# Patient Record
Sex: FEMALE | Race: WHITE | NOT HISPANIC OR LATINO
[De-identification: names, ages, dates, MRNs, and addresses within clinical notes are randomized per-mention and may not be internally consistent; named-entity substitution may affect disease eponyms.]

---

## 2018-07-11 ENCOUNTER — FORM ENCOUNTER (OUTPATIENT)
Age: 78
End: 2018-07-11

## 2018-07-11 ENCOUNTER — RX RENEWAL (OUTPATIENT)
Age: 78
End: 2018-07-11

## 2018-07-12 ENCOUNTER — APPOINTMENT (OUTPATIENT)
Dept: MRI IMAGING | Facility: HOSPITAL | Age: 78
End: 2018-07-12
Payer: MEDICARE

## 2018-07-12 ENCOUNTER — OUTPATIENT (OUTPATIENT)
Dept: OUTPATIENT SERVICES | Facility: HOSPITAL | Age: 78
LOS: 1 days | End: 2018-07-12
Payer: MEDICARE

## 2018-07-12 PROCEDURE — 70553 MRI BRAIN STEM W/O & W/DYE: CPT

## 2018-07-12 PROCEDURE — 70553 MRI BRAIN STEM W/O & W/DYE: CPT | Mod: 26

## 2018-07-12 PROCEDURE — A9585: CPT

## 2018-07-16 DIAGNOSIS — I10 ESSENTIAL (PRIMARY) HYPERTENSION: ICD-10-CM

## 2018-07-16 DIAGNOSIS — Z82.49 FAMILY HISTORY OF ISCHEMIC HEART DISEASE AND OTHER DISEASES OF THE CIRCULATORY SYSTEM: ICD-10-CM

## 2018-10-12 ENCOUNTER — APPOINTMENT (OUTPATIENT)
Dept: NEUROSURGERY | Facility: CLINIC | Age: 78
End: 2018-10-12
Payer: MEDICARE

## 2018-10-12 VITALS
WEIGHT: 160 LBS | HEART RATE: 63 BPM | DIASTOLIC BLOOD PRESSURE: 83 MMHG | TEMPERATURE: 97.8 F | OXYGEN SATURATION: 99 % | HEIGHT: 63 IN | RESPIRATION RATE: 18 BRPM | BODY MASS INDEX: 28.35 KG/M2 | SYSTOLIC BLOOD PRESSURE: 151 MMHG

## 2018-10-12 PROCEDURE — 99214 OFFICE O/P EST MOD 30 MIN: CPT

## 2021-03-11 ENCOUNTER — EMERGENCY (EMERGENCY)
Facility: HOSPITAL | Age: 81
LOS: 1 days | Discharge: HOME | End: 2021-03-11
Attending: EMERGENCY MEDICINE | Admitting: EMERGENCY MEDICINE
Payer: MEDICARE

## 2021-03-11 VITALS
TEMPERATURE: 98 F | DIASTOLIC BLOOD PRESSURE: 66 MMHG | RESPIRATION RATE: 18 BRPM | OXYGEN SATURATION: 98 % | HEIGHT: 63 IN | HEART RATE: 77 BPM | WEIGHT: 143.96 LBS | SYSTOLIC BLOOD PRESSURE: 168 MMHG

## 2021-03-11 VITALS
DIASTOLIC BLOOD PRESSURE: 67 MMHG | RESPIRATION RATE: 18 BRPM | HEART RATE: 71 BPM | SYSTOLIC BLOOD PRESSURE: 128 MMHG | OXYGEN SATURATION: 98 % | TEMPERATURE: 98 F

## 2021-03-11 DIAGNOSIS — Z90.49 ACQUIRED ABSENCE OF OTHER SPECIFIED PARTS OF DIGESTIVE TRACT: Chronic | ICD-10-CM

## 2021-03-11 DIAGNOSIS — Z20.822 CONTACT WITH AND (SUSPECTED) EXPOSURE TO COVID-19: ICD-10-CM

## 2021-03-11 DIAGNOSIS — K46.9 UNSPECIFIED ABDOMINAL HERNIA WITHOUT OBSTRUCTION OR GANGRENE: Chronic | ICD-10-CM

## 2021-03-11 DIAGNOSIS — E04.1 NONTOXIC SINGLE THYROID NODULE: ICD-10-CM

## 2021-03-11 LAB
ALBUMIN SERPL ELPH-MCNC: 4 G/DL — SIGNIFICANT CHANGE UP (ref 3.3–5)
ALP SERPL-CCNC: 79 U/L — SIGNIFICANT CHANGE UP (ref 40–120)
ALT FLD-CCNC: 25 U/L — SIGNIFICANT CHANGE UP (ref 10–45)
ANION GAP SERPL CALC-SCNC: 10 MMOL/L — SIGNIFICANT CHANGE UP (ref 5–17)
APTT BLD: 30.2 SEC — SIGNIFICANT CHANGE UP (ref 27.5–35.5)
AST SERPL-CCNC: 22 U/L — SIGNIFICANT CHANGE UP (ref 10–40)
BASOPHILS # BLD AUTO: 0.05 K/UL — SIGNIFICANT CHANGE UP (ref 0–0.2)
BASOPHILS NFR BLD AUTO: 0.5 % — SIGNIFICANT CHANGE UP (ref 0–2)
BILIRUB SERPL-MCNC: 0.8 MG/DL — SIGNIFICANT CHANGE UP (ref 0.2–1.2)
BLD GP AB SCN SERPL QL: NEGATIVE — SIGNIFICANT CHANGE UP
BUN SERPL-MCNC: 15 MG/DL — SIGNIFICANT CHANGE UP (ref 7–23)
CALCIUM SERPL-MCNC: 9.8 MG/DL — SIGNIFICANT CHANGE UP (ref 8.4–10.5)
CHLORIDE SERPL-SCNC: 96 MMOL/L — SIGNIFICANT CHANGE UP (ref 96–108)
CO2 SERPL-SCNC: 28 MMOL/L — SIGNIFICANT CHANGE UP (ref 22–31)
CREAT SERPL-MCNC: 0.75 MG/DL — SIGNIFICANT CHANGE UP (ref 0.5–1.3)
EOSINOPHIL # BLD AUTO: 0.1 K/UL — SIGNIFICANT CHANGE UP (ref 0–0.5)
EOSINOPHIL NFR BLD AUTO: 1.1 % — SIGNIFICANT CHANGE UP (ref 0–6)
GLUCOSE SERPL-MCNC: 97 MG/DL — SIGNIFICANT CHANGE UP (ref 70–99)
HCT VFR BLD CALC: 35.1 % — SIGNIFICANT CHANGE UP (ref 34.5–45)
HGB BLD-MCNC: 11.2 G/DL — LOW (ref 11.5–15.5)
IMM GRANULOCYTES NFR BLD AUTO: 0.4 % — SIGNIFICANT CHANGE UP (ref 0–1.5)
INR BLD: 1.03 — SIGNIFICANT CHANGE UP (ref 0.88–1.16)
LYMPHOCYTES # BLD AUTO: 3.54 K/UL — HIGH (ref 1–3.3)
LYMPHOCYTES # BLD AUTO: 37.2 % — SIGNIFICANT CHANGE UP (ref 13–44)
MCHC RBC-ENTMCNC: 27.1 PG — SIGNIFICANT CHANGE UP (ref 27–34)
MCHC RBC-ENTMCNC: 31.9 GM/DL — LOW (ref 32–36)
MCV RBC AUTO: 85 FL — SIGNIFICANT CHANGE UP (ref 80–100)
MONOCYTES # BLD AUTO: 0.67 K/UL — SIGNIFICANT CHANGE UP (ref 0–0.9)
MONOCYTES NFR BLD AUTO: 7 % — SIGNIFICANT CHANGE UP (ref 2–14)
NEUTROPHILS # BLD AUTO: 5.11 K/UL — SIGNIFICANT CHANGE UP (ref 1.8–7.4)
NEUTROPHILS NFR BLD AUTO: 53.8 % — SIGNIFICANT CHANGE UP (ref 43–77)
NRBC # BLD: 0 /100 WBCS — SIGNIFICANT CHANGE UP (ref 0–0)
PLATELET # BLD AUTO: 258 K/UL — SIGNIFICANT CHANGE UP (ref 150–400)
POTASSIUM SERPL-MCNC: 3.5 MMOL/L — SIGNIFICANT CHANGE UP (ref 3.5–5.3)
POTASSIUM SERPL-SCNC: 3.5 MMOL/L — SIGNIFICANT CHANGE UP (ref 3.5–5.3)
PROT SERPL-MCNC: 6.5 G/DL — SIGNIFICANT CHANGE UP (ref 6–8.3)
PROTHROM AB SERPL-ACNC: 12.3 SEC — SIGNIFICANT CHANGE UP (ref 10.6–13.6)
RBC # BLD: 4.13 M/UL — SIGNIFICANT CHANGE UP (ref 3.8–5.2)
RBC # FLD: 12 % — SIGNIFICANT CHANGE UP (ref 10.3–14.5)
RH IG SCN BLD-IMP: POSITIVE — SIGNIFICANT CHANGE UP
RH IG SCN BLD-IMP: POSITIVE — SIGNIFICANT CHANGE UP
SARS-COV-2 RNA SPEC QL NAA+PROBE: SIGNIFICANT CHANGE UP
SODIUM SERPL-SCNC: 134 MMOL/L — LOW (ref 135–145)
WBC # BLD: 9.51 K/UL — SIGNIFICANT CHANGE UP (ref 3.8–10.5)
WBC # FLD AUTO: 9.51 K/UL — SIGNIFICANT CHANGE UP (ref 3.8–10.5)

## 2021-03-11 PROCEDURE — 86850 RBC ANTIBODY SCREEN: CPT

## 2021-03-11 PROCEDURE — 85025 COMPLETE CBC W/AUTO DIFF WBC: CPT

## 2021-03-11 PROCEDURE — 85730 THROMBOPLASTIN TIME PARTIAL: CPT

## 2021-03-11 PROCEDURE — 86901 BLOOD TYPING SEROLOGIC RH(D): CPT

## 2021-03-11 PROCEDURE — 80053 COMPREHEN METABOLIC PANEL: CPT

## 2021-03-11 PROCEDURE — 36415 COLL VENOUS BLD VENIPUNCTURE: CPT

## 2021-03-11 PROCEDURE — 86900 BLOOD TYPING SEROLOGIC ABO: CPT

## 2021-03-11 PROCEDURE — U0005: CPT

## 2021-03-11 PROCEDURE — U0003: CPT

## 2021-03-11 PROCEDURE — 99284 EMERGENCY DEPT VISIT MOD MDM: CPT | Mod: CS

## 2021-03-11 PROCEDURE — 99284 EMERGENCY DEPT VISIT MOD MDM: CPT | Mod: 25

## 2021-03-11 PROCEDURE — 85610 PROTHROMBIN TIME: CPT

## 2021-03-11 PROCEDURE — 84443 ASSAY THYROID STIM HORMONE: CPT

## 2021-03-11 RX ORDER — LISINOPRIL 2.5 MG/1
1 TABLET ORAL
Qty: 0 | Refills: 0 | DISCHARGE

## 2021-03-11 RX ORDER — LEVOTHYROXINE SODIUM 125 MCG
1 TABLET ORAL
Qty: 0 | Refills: 0 | DISCHARGE

## 2021-03-11 RX ORDER — SIMVASTATIN 20 MG/1
1 TABLET, FILM COATED ORAL
Qty: 0 | Refills: 0 | DISCHARGE

## 2021-03-11 RX ORDER — HYDROCORTISONE 20 MG
1 TABLET ORAL
Qty: 0 | Refills: 0 | DISCHARGE

## 2021-03-11 NOTE — ED PROVIDER NOTE - CARE PROVIDER_API CALL
Chay Roy)  Otolaryngology  [address] [place] [address]  Phone: [phone]  Fax: [phone]  Follow Up Time:

## 2021-03-11 NOTE — ED ADULT TRIAGE NOTE - ARRIVAL FROM
Pt here for OB visit, +FM. No concerns at today's visit. Tdap given today. See MAR for administration details.  
Home

## 2021-03-11 NOTE — ED PROVIDER NOTE - ENMT, MLM
Airway patent, Nasal mucosa clear. Mouth with normal mucosa. Throat has no vesicles, no oropharyngeal exudates and uvula is midline. + enlarged thyroid, no stridor b/l

## 2021-03-11 NOTE — ED ADULT NURSE NOTE - PMH
HLD (hyperlipidemia)    HTN (hypertension)    Hypothyroid    Pituitary tumor  removed in 12/15/2008 by MD Ferguson

## 2021-03-11 NOTE — ED PROVIDER NOTE - NSFOLLOWUPINSTRUCTIONS_ED_ALL_ED_FT
Thyroid Nodules  WHAT YOU NEED TO KNOW:  Thyroid nodules are growths on your thyroid gland. Your thyroid makes hormones that help control your body temperature, heart rate, and growth. The hormones also control how fast your body uses food for energy. Some nodules are lumps of tissue, and others are filled with fluid.   DISCHARGE INSTRUCTIONS:  Medicines:   •Thyroid medicine is given to bring your thyroid hormone levels back to a normal range.   •Take your medicine as directed. Contact your healthcare provider if you think your medicine is not helping or if you have side effects. Tell him or her if you are allergic to any medicine. Keep a list of the medicines, vitamins, and herbs you take. Include the amounts, and when and why you take them. Bring the list or the pill bottles to follow-up visits. Carry your medicine list with you in case of an emergency.  Follow up with your healthcare provider as directed: You may need frequent blood tests to check your thyroid hormone levels. You may also need tests such as an ultrasound to check if any nodules are growing or have returned. Write down your questions so you remember to ask them during your visits.  Eat iodine-rich foods: Examples include fish, seaweed, dairy products, eggs, beans, and lean meat. Iodized salt also contains iodine. You may need to use iodized table salt when you cook and season your food. Iodine may be added to bread or to your drinking water. Ask for a list of foods that contain iodine, and ask how much iodine you need each day.  Contact your healthcare provider if:   •You have a new cough that does not improve.  •You begin choking or have new or increased trouble swallowing.   •Your voice becomes hoarse.  •You are losing weight without trying.  •You have questions or concerns about your condition or care.  Return to the emergency department if:   •You have sudden chest pain or trouble breathing.  •Your symptoms worsen, even after you take your medicines.

## 2021-03-11 NOTE — ED PROVIDER NOTE - CONSULTANT FREE TEXT FOR MDM DISCUSSED CASE WITH QUESTION
neurosurg since pt sent in by dr huffman neurosurg since pt sent in by dr huffman; I spoke to dr huffman directly and he's requesting that ent eval pt - he has spoken to dr collier about the pt

## 2021-03-11 NOTE — ED PROVIDER NOTE - ATTENDING CONTRIBUTION TO CARE
79 yo female sent by Dr Porras for eval of recently diagnosed thyroid nodule.  Pt has had recent extensive eval including MRI, us in [place] w plans for biopsy and poss thyroidectomy.  Pt wanted additional eval and recommendations due to feeling she had conflicting recommendations.  Pt w/o sig change in her thyroid related sx. Pt compliant w meds prescribed by her endo. Well appearing, nad, nc/at, lung cta, heart reg, abd soft, nt, ext no gross deformity, no gross neuro deficits   ENT consulted - recommend outpt fu.  Pt amenable and dc'd w plan to fu Dr Roy.

## 2021-03-11 NOTE — ED PROVIDER NOTE - CLINICAL SUMMARY MEDICAL DECISION MAKING FREE TEXT BOX
pt w/known thyroid nodule, has seen ent and endocrinologist in [place], is scheduled for thyroid bx in 4 d, c/o sob, weight loss and bp fluctuations - states that was told to come to ed by dr cisneros - I spoke to dr cisneros who is requesting ent eval and ok w/dc home. pt well appearing, non toxic, hemodynamically stable, labs wnl. ent consulted and eval'd pt pt w/known thyroid nodule, has seen ent and endocrinologist in [place], is scheduled for thyroid bx in 4 d, c/o sob, weight loss and bp fluctuations - states that was told to come to ed by dr cisneros - I spoke to dr cisneros who is requesting ent eval and ok w/dc home. pt well appearing, non toxic, hemodynamically stable, labs wnl. ent consulted and eval'd pt - plan is outpatient f/u. pt understands and agrees w/plan

## 2021-03-11 NOTE — CONSULT NOTE ADULT - SUBJECTIVE AND OBJECTIVE BOX
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 Pt states she was told to come to ED by Dr. To. Pt states over past few months, she has had 10 lb wt loss, intermittent elevated BP, and occasional HA; otherwise feels at baseline. Denies dyspnea, dysphagia, odynophagia, dysphonia. Had recent TFTs (T3: 9.2; TSH <0.005); her endocrinologist has not seen labs yet. Thyroid US showed 4cm L thryoid nodule per pt. Here for 2nd opinion regarding management.     PAST MEDICAL & SURGICAL HISTORY:  Pituitary tumor  removed in 12/15/2008 by MD Ferguson    Hypothyroid    HLD (hyperlipidemia)    HTN (hypertension)    Abdominal hernia  repair in 2015    History of appendectomy      Allergies    No Known Allergies    Intolerances      MEDICATIONS  (STANDING):    MEDICATIONS  (PRN):        Vital Signs Last 24 Hrs  T(C): 36.9 (11 Mar 2021 14:57), Max: 37 (11 Mar 2021 12:54)  T(F): 98.4 (11 Mar 2021 14:57), Max: 98.6 (11 Mar 2021 12:54)  HR: 71 (11 Mar 2021 14:57) (66 - 77)  BP: 128/67 (11 Mar 2021 14:57) (114/66 - 168/66)  BP(mean): --  RR: 18 (11 Mar 2021 14:57) (18 - 18)  SpO2: 98% (11 Mar 2021 14:57) (97% - 98%)                          11.2   9.51  )-----------( 258      ( 11 Mar 2021 12:55 )             35.1    03-11    134<L>  |  96  |  15  ----------------------------<  97  3.5   |  28  |  0.75    Ca    9.8      11 Mar 2021 12:55    TPro  6.5  /  Alb  4.0  /  TBili  0.8  /  DBili  x   /  AST  22  /  ALT  25  /  AlkPhos  79  03-11   PT/INR - ( 11 Mar 2021 12:55 )   PT: 12.3 sec;   INR: 1.03          PTT - ( 11 Mar 2021 12:55 )  PTT:30.2 sec    PHYSICAL EXAM:  Gen: NAD, Voice: strong  Face: no edema, erythema, or fluctuance. Parotid glands soft without mass  Eyes: no scleral injection, EOMI  Nose: Nares bilaterally patent, no discharge  Mouth: No Stridor / Drooling / Trismus.  Mucosa moist, tongue/uvula midline, oropharynx clear  Neck: Flat, supple, no lymphadenopathy, trachea midline, no thryomegaly  Lymphatic: No lymphadenopathy  Resp: breathing easily, no stridor      80F here with 4cm thyroid nodule. Currently undergoing workup in [place]. Here for 2nd opinion.  -Please have patient follow up with Dr. Roy for further outpatient evaluation  -Pt instructed to inform endocrinologist of recent TFTs to consider decreasing synthroid  -Pt plans to obtain [facility]-FNA of thyroid nodule in [place]  -[facility] with questions/cocnerns

## 2021-03-11 NOTE — ED ADULT NURSE NOTE - OBJECTIVE STATEMENT
Pt states about 3 weeks ago she started hearing her heartbeat on her left ear. This led to the discovery of a 4cm thyroid nodule 3 weeks ago, pending biopsy on Monday, referred to [facility] ED by MD huffman for shortness of breath, lack of appetite/weight loss (9 lbs in the last 4 weeks) excessive throat clearing, nausea. SOB is worst with exertion when walking short distance 200ft. Pt denies any chest pain, dizziness, vomiting or diarrhea. Pt was seen and discharged by another ED this week. Pt was diagnosed with UTI and was placed on nitrofurantoin. Hx of pituitary tumor removal

## 2021-03-11 NOTE — ED PROVIDER NOTE - PATIENT PORTAL LINK FT
You can access the FollowMyHealth Patient Portal offered by [facility] by registering at the following website: [email or web address]. By joining [facility]’s FollowMyHealth portal, you will also be able to view your health information using other applications (apps) compatible with our system.

## 2021-03-11 NOTE — ED ADULT NURSE NOTE - CHIEF COMPLAINT QUOTE
PT presents reporting she has recently discovered 4cm thyroid nodule, pending biopsy on monday, referred to [facility] ED by MD huffman for shortness of breath, lack of appetite/weight loss, excessive throat clearing. Pt was seen and discharged by another ED this week.

## 2021-03-11 NOTE — ED ADULT TRIAGE NOTE - CHIEF COMPLAINT QUOTE
PT presents reporting she has recently discovered 4cm thyroid nodule, pending biopsy on monday, referred to [facility] ED by MD huffman for shortness of breath, lack of appetite/weight loss, excessive throat clearing. Pt was seen and discharged by another ED this week.

## 2021-03-12 ENCOUNTER — TRANSCRIPTION ENCOUNTER (OUTPATIENT)
Age: 81
End: 2021-03-12

## 2021-03-15 ENCOUNTER — APPOINTMENT (OUTPATIENT)
Dept: OTOLARYNGOLOGY | Facility: CLINIC | Age: 81
End: 2021-03-15
Payer: MEDICARE

## 2021-03-15 VITALS
TEMPERATURE: 97.7 F | OXYGEN SATURATION: 98 % | SYSTOLIC BLOOD PRESSURE: 136 MMHG | WEIGHT: 144 LBS | DIASTOLIC BLOOD PRESSURE: 75 MMHG | HEART RATE: 61 BPM | BODY MASS INDEX: 25.52 KG/M2 | HEIGHT: 63 IN

## 2021-03-15 DIAGNOSIS — E78.00 PURE HYPERCHOLESTEROLEMIA, UNSPECIFIED: ICD-10-CM

## 2021-03-15 DIAGNOSIS — Z78.9 OTHER SPECIFIED HEALTH STATUS: ICD-10-CM

## 2021-03-15 PROCEDURE — 31575 DIAGNOSTIC LARYNGOSCOPY: CPT

## 2021-03-15 PROCEDURE — 99204 OFFICE O/P NEW MOD 45 MIN: CPT | Mod: 25

## 2021-03-15 RX ORDER — ASCORBIC ACID 500 MG
TABLET ORAL
Refills: 0 | Status: ACTIVE | COMMUNITY

## 2021-03-15 NOTE — PHYSICAL EXAM
[Normal] : no neck adenopathy [de-identified] : left neck 4cm thyroid nodule [Laryngoscopy Performed] : laryngoscopy was performed, see procedure section for findings

## 2021-03-15 NOTE — HISTORY OF PRESENT ILLNESS
[de-identified] : 79yo F, referred by Dr. To for a thyroid nodule evaluation. recently as part of tinnitus evaluation she was found to have a left thyroid 4cm nodule, with oppressed TSH and increased T4. she is under Syntyroid tx since 2008 after a pituitary surgery. She denies compressive symptoms of dysphagia/dyspnea/AW. \par she denies previous thyroid issues, denies familial hx of thyroid ca, or irradiation exposure. \par \par 2/26/21 Thyroid US \par LEFT lobe 6.2 x 2.8 x 1.6 cm with 4 x 3 x 2.7 cm hyperechoic MLP nodule.\par RIGHT lobe 3.9 x 1.5 x 1.2 cm with 8 mm, 6 mm, and 3 mm nodules.\par fna was never done\par 3/1 TSH <0.005 L, FT4 3.20 H, FT3 9.2 H, TPO Ab 62 H, Tg Ab <1\par On levothyroxine 75mcg by endo. due to ED visits x 2 for hyperthyroid symptoms, levothyroxine tx is on hold. \par

## 2021-03-15 NOTE — REASON FOR VISIT
[Initial Evaluation] : an initial evaluation for [Family Member] : family member [FreeTextEntry2] : thyroid nodule

## 2021-03-15 NOTE — PROCEDURE
[Image(s) Captured] : image(s) captured and filed [Unable to Cooperate with Mirror] : patient unable to cooperate with mirror [de-identified] : Flexible fiberoptic laryngoscope advanced orally, tonsillar pillars and tonsils/tonsil bed visualized, oropharyngeal and hypopharyngeal walls and BOT, epiglottis all visualized, no masses, no fungating/ulcerating lesions evident; larynx visualized, true vocal cords abduct and adduct and meet in the midline no leuko/erythroplakic lesions . No pooling of secretions.

## 2021-03-15 NOTE — ASSESSMENT
[FreeTextEntry1] : 81 yo F, with 4cm left thyroid nodule, and hyperthyroid tft's under levothyroxine. \par We thoroughly discussed the diagnosis with the patient and explained the required workup, f/u and treatment options.\par \par \par plan:\par - Ask [facility] which thyroid scan they recommend\par - Obtain [facility] scan\par - f/u with results\par - f/u endo, f/u pcp/cardio for blood pressure\par - RTC sooner should any worrisome symptoms develop.\par - Pt verbalized understanding of above.\par

## 2021-03-17 PROBLEM — E78.5 HYPERLIPIDEMIA, UNSPECIFIED: Chronic | Status: ACTIVE | Noted: 2021-03-11

## 2021-03-17 PROBLEM — D49.7 NEOPLASM OF UNSPECIFIED BEHAVIOR OF ENDOCRINE GLANDS AND OTHER PARTS OF NERVOUS SYSTEM: Chronic | Status: ACTIVE | Noted: 2021-03-11

## 2021-03-17 PROBLEM — E03.9 HYPOTHYROIDISM, UNSPECIFIED: Chronic | Status: ACTIVE | Noted: 2021-03-11

## 2021-03-17 PROBLEM — I10 ESSENTIAL (PRIMARY) HYPERTENSION: Chronic | Status: ACTIVE | Noted: 2021-03-11

## 2021-03-18 ENCOUNTER — OUTPATIENT (OUTPATIENT)
Dept: OUTPATIENT SERVICES | Facility: HOSPITAL | Age: 81
LOS: 1 days | End: 2021-03-18
Payer: MEDICARE

## 2021-03-18 DIAGNOSIS — K46.9 UNSPECIFIED ABDOMINAL HERNIA WITHOUT OBSTRUCTION OR GANGRENE: Chronic | ICD-10-CM

## 2021-03-18 DIAGNOSIS — Z90.49 ACQUIRED ABSENCE OF OTHER SPECIFIED PARTS OF DIGESTIVE TRACT: Chronic | ICD-10-CM

## 2021-03-19 ENCOUNTER — RESULT REVIEW (OUTPATIENT)
Age: 81
End: 2021-03-19

## 2021-03-19 PROCEDURE — 78014 THYROID IMAGING W/BLOOD FLOW: CPT | Mod: 26,ME

## 2021-03-19 PROCEDURE — A9516: CPT

## 2021-03-19 PROCEDURE — 78014 THYROID IMAGING W/BLOOD FLOW: CPT

## 2021-03-19 PROCEDURE — G1004: CPT

## 2021-04-02 ENCOUNTER — APPOINTMENT (OUTPATIENT)
Dept: ENDOCRINOLOGY | Facility: CLINIC | Age: 81
End: 2021-04-02
Payer: MEDICARE

## 2021-04-02 VITALS — BODY MASS INDEX: 24.45 KG/M2 | HEIGHT: 63 IN | WEIGHT: 138 LBS

## 2021-04-02 DIAGNOSIS — E04.2 NONTOXIC MULTINODULAR GOITER: ICD-10-CM

## 2021-04-02 DIAGNOSIS — E04.1 NONTOXIC SINGLE THYROID NODULE: ICD-10-CM

## 2021-04-02 PROCEDURE — 99204 OFFICE O/P NEW MOD 45 MIN: CPT | Mod: 95

## 2021-04-02 NOTE — ASSESSMENT
[FreeTextEntry1] : Pituitary adenoma history and on thyroid and cortisol replacements but she may not need these.\par She is off thyroxine for 2 weeks now.  Wait another two weeks and then go for TFTs (4 weeks off, so that thyroxine will be completely out of her system), and if normal, she can stay off thyroxine.  It can be tricky with pituitary disease, TSH may not be reliable.  Goal free T4 in the upper half of normal range.  But will also check FSH to check her pituitary function; FSH should be high since she’s menopausal.  If FSH is high, then her pit function is good.\par Reduce hydrocortisone to 10mg am, and 5pm  and then will check am cortisol prior to am dose.  if this is normal, will continue trying to taper down HC dose.  Symptoms of adrenal insufficiency reviewed, in case they develop.\par \par Multinodular thyroid.\par She likely will need FNA of the thyroid nodule since L side is relatively “cold” on scan  so the nodule is not autonomously functioning (not “hot” which are usually benign and don’t need FNA)\par Many of her symptoms are consistent with hyperthyroidism and should resolve now that she is off thyroxine.\par \par

## 2021-04-02 NOTE — HISTORY OF PRESENT ILLNESS
[Other Location: e.g. School (Enter Location, City,State)___] : at [unfilled], at the time of the visit. 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  Lost 17 lb since Feb and still losing.  Lost 2 lb this week.\par In January, was feeling pulsing in her ear, went to ENT and on imaging, was found to have 4cm thyroid nodules\par Went for thyroid scan and uptake, results reviewed:  normal uptake on R, decreased uptake on L side. 47.7% uptake. After stopping thyroxine one week prior\par After stopping thyroxine, first felt fatigued but now feels better, less VILLAGOMEZ and palpitations.\par Had some nausea before, but not now.\par BP typically 150s/70s and pulse 50-70s.  takes beta blocker when pulse over 60.\par Has to eat small portions or else throat feels funny.\par has never needed stress dose steroids, has been healthy since her pituitary surgery\par \par Meds:\par Hydrocortisone 10mg am and with dinner\par Levothyroxine 75mcg\par Lisinopril, HCTZ, \par Simvastatin 10mg, aspirin 81mg\par

## 2021-04-02 NOTE — REASON FOR VISIT
[Initial Evaluation] : an initial evaluation [Pituitary Evaluation/ Disorder] : pituitary evaluation/disorder [Thyroid nodule/ MNG] : thyroid nodule/ MNG [FreeTextEntry2] : Dr Roy

## 2021-04-02 NOTE — DATA REVIEWED
[FreeTextEntry1] : 3/11/21: TSH < 0.001, tot chol 143, HDL 50, LDL 74, trig 106\par 2/21: free T3 9.2 (2-4.4 normal), free T4 3.2, TSH < 0.005, TPO 62, Tg Ab < 1\par \par thyroid sono, 3/21:\par enlarged L lobe with dominant nodule 40 x 30 x 27mm, hyperechoic, tall > wide\par sub-cm nodules on the R (7mm, 3mm, 3mm)\par \par thyroid uptake/scan, 3/21: uptake 47.7%\par uniform uptake in R lobe with normal shape.  smaller L lobe with less activity c/w cold nodule in L lobe\par \par MRI, 2018, pituitary: \par expected post surgical change.  no masslike enhancing tissue in sella or suprasellar cistern.  thin rim on enhancing tissue lining sells, presumably residual pituitary gland.  stalk deviated to R.

## 2021-04-06 ENCOUNTER — TRANSCRIPTION ENCOUNTER (OUTPATIENT)
Age: 81
End: 2021-04-06

## 2021-04-07 ENCOUNTER — TRANSCRIPTION ENCOUNTER (OUTPATIENT)
Age: 81
End: 2021-04-07

## 2021-04-12 ENCOUNTER — TRANSCRIPTION ENCOUNTER (OUTPATIENT)
Age: 81
End: 2021-04-12

## 2021-04-19 ENCOUNTER — TRANSCRIPTION ENCOUNTER (OUTPATIENT)
Age: 81
End: 2021-04-19

## 2021-04-23 ENCOUNTER — TRANSCRIPTION ENCOUNTER (OUTPATIENT)
Age: 81
End: 2021-04-23

## 2021-04-30 ENCOUNTER — TRANSCRIPTION ENCOUNTER (OUTPATIENT)
Age: 81
End: 2021-04-30

## 2021-05-11 ENCOUNTER — TRANSCRIPTION ENCOUNTER (OUTPATIENT)
Age: 81
End: 2021-05-11

## 2021-05-12 ENCOUNTER — TRANSCRIPTION ENCOUNTER (OUTPATIENT)
Age: 81
End: 2021-05-12

## 2021-05-12 ENCOUNTER — APPOINTMENT (OUTPATIENT)
Dept: ULTRASOUND IMAGING | Facility: HOSPITAL | Age: 81
End: 2021-05-12
Payer: MEDICARE

## 2021-05-12 ENCOUNTER — OUTPATIENT (OUTPATIENT)
Dept: OUTPATIENT SERVICES | Facility: HOSPITAL | Age: 81
LOS: 1 days | End: 2021-05-12
Payer: MEDICARE

## 2021-05-12 ENCOUNTER — RESULT REVIEW (OUTPATIENT)
Age: 81
End: 2021-05-12

## 2021-05-12 DIAGNOSIS — K46.9 UNSPECIFIED ABDOMINAL HERNIA WITHOUT OBSTRUCTION OR GANGRENE: Chronic | ICD-10-CM

## 2021-05-12 DIAGNOSIS — Z90.49 ACQUIRED ABSENCE OF OTHER SPECIFIED PARTS OF DIGESTIVE TRACT: Chronic | ICD-10-CM

## 2021-05-12 PROCEDURE — 88305 TISSUE EXAM BY PATHOLOGIST: CPT | Mod: 26

## 2021-05-12 PROCEDURE — 88173 CYTOPATH EVAL FNA REPORT: CPT

## 2021-05-12 PROCEDURE — 88173 CYTOPATH EVAL FNA REPORT: CPT | Mod: 26

## 2021-05-12 PROCEDURE — 88305 TISSUE EXAM BY PATHOLOGIST: CPT

## 2021-05-12 PROCEDURE — 10005 FNA BX W/US GDN 1ST LES: CPT

## 2021-05-13 ENCOUNTER — TRANSCRIPTION ENCOUNTER (OUTPATIENT)
Age: 81
End: 2021-05-13

## 2021-05-14 ENCOUNTER — APPOINTMENT (OUTPATIENT)
Dept: ENDOCRINOLOGY | Facility: CLINIC | Age: 81
End: 2021-05-14
Payer: MEDICARE

## 2021-05-14 VITALS
HEIGHT: 63 IN | BODY MASS INDEX: 23.92 KG/M2 | DIASTOLIC BLOOD PRESSURE: 68 MMHG | WEIGHT: 135 LBS | HEART RATE: 75 BPM | SYSTOLIC BLOOD PRESSURE: 122 MMHG

## 2021-05-14 DIAGNOSIS — E05.00 THYROTOXICOSIS WITH DIFFUSE GOITER W/OUT THYROTOXIC CRISIS OR STORM: ICD-10-CM

## 2021-05-14 LAB — NON-GYNECOLOGICAL CYTOLOGY STUDY: SIGNIFICANT CHANGE UP

## 2021-05-14 PROCEDURE — 99214 OFFICE O/P EST MOD 30 MIN: CPT

## 2021-05-14 NOTE — ASSESSMENT
[FreeTextEntry1] : Hyperthyroid, probably Graves with cold nodule.\par If nodule pathology comes back suspicious or positive for malignancy, will refer for total thyroidectomy, which will remove nodule and solve hyperthyroidism.\par Start methimazole 10mg/day which she will continue if the nodule is benign (then will treat with medication for now) but she can still have surgery as a treatment for hyperthyroidism.\par She will need lifelong thyroid replacement if she undergoes thyroid surgery (but she was taking levothyroxine before, so she is used to taking it).\par can hold beta blocker and take only if heart rate is over 80\par [facility] form given to recheck TFTs in 6 weeks\par

## 2021-05-14 NOTE — HISTORY OF PRESENT ILLNESS
[FreeTextEntry1] : here with daughter\par Lost 5 lb in the past week.  \par no heat intolerance.  appetite is ok but she gets full quickly so is eating less\par VILLAGOMEZ, easily winded.  BMs 2-3x/day\par always coughing\par fully vaccinated for Covid\par no palpitations but takes beta blocker when heart rate is over 60.  \par had FNA of thyroid nodule two days ago\par sister has Hashimoto's\par moving to [facility] next week\par \par PMH: pit macroadenoma, surgery 2008\par thyroid nodule, hyperthyroid\par \par Meds:\par Hydrocortisone 10mg am and  5mg pm\par Levothyroxine 75mcg -- off\par Lisinopril, HCTZ, \par Simvastatin 10mg, aspirin 81mg\par

## 2021-05-14 NOTE — DATA REVIEWED
[FreeTextEntry1] : 5/21: TSH < 0.005, T4 10.3, free T4 2.24, T3 189\par 3/11/21: TSH < 0.001, tot chol 143, HDL 50, LDL 74, trig 106\par 2/21: free T3 9.2 (2-4.4 normal), free T4 3.2, TSH < 0.005, TPO 62, Tg Ab < 1\par \par thyroid sono, 3/21:\par enlarged L lobe with dominant nodule 40 x 30 x 27mm, hyperechoic, tall > wide\par sub-cm nodules on the R (7mm, 3mm, 3mm)\par \par thyroid uptake/scan, 3/21: uptake 47.7%\par uniform uptake in R lobe with normal shape.  smaller L lobe with less activity c/w cold nodule in L lobe\par \par MRI, 2018, pituitary: \par expected post surgical change.  no masslike enhancing tissue in sella or suprasellar cistern.  thin rim on enhancing tissue lining sells, presumably residual pituitary gland.  stalk deviated to R.

## 2021-05-18 ENCOUNTER — APPOINTMENT (OUTPATIENT)
Dept: ENDOCRINOLOGY | Facility: CLINIC | Age: 81
End: 2021-05-18

## 2021-05-27 ENCOUNTER — TRANSCRIPTION ENCOUNTER (OUTPATIENT)
Age: 81
End: 2021-05-27

## 2021-07-02 ENCOUNTER — NON-APPOINTMENT (OUTPATIENT)
Age: 81
End: 2021-07-02

## 2021-09-28 ENCOUNTER — TRANSCRIPTION ENCOUNTER (OUTPATIENT)
Age: 81
End: 2021-09-28

## 2021-09-29 ENCOUNTER — TRANSCRIPTION ENCOUNTER (OUTPATIENT)
Age: 81
End: 2021-09-29

## 2021-10-28 ENCOUNTER — TRANSCRIPTION ENCOUNTER (OUTPATIENT)
Age: 81
End: 2021-10-28

## 2021-11-05 ENCOUNTER — APPOINTMENT (OUTPATIENT)
Dept: ENDOCRINOLOGY | Facility: CLINIC | Age: 81
End: 2021-11-05
Payer: MEDICARE

## 2021-11-05 VITALS — WEIGHT: 145 LBS | BODY MASS INDEX: 25.69 KG/M2

## 2021-11-05 PROCEDURE — 99214 OFFICE O/P EST MOD 30 MIN: CPT | Mod: 95

## 2021-11-05 RX ORDER — METHIMAZOLE 5 MG/1
5 TABLET ORAL DAILY
Qty: 30 | Refills: 2 | Status: DISCONTINUED | COMMUNITY
Start: 2021-05-14 | End: 2021-11-05

## 2021-11-05 NOTE — ASSESSMENT
[FreeTextEntry1] : Graves with cold nodule\par FNA benign in May 2021.\par Now in hypothyroid range.  Discontinue methimazole and recheck TFTs in 6 weeks (will send [facility] form)\par reviewed symptoms of hypothyroidism, including bradycardia and cold intolerance (which she has), hair loss, fatigue, weight gain.   hypothyroidism can also contribute to high cholesterol and anemia.\par \par Pituitary adenoma.  Secondary adrenal insufficiency\par continue maintenance hydrocortisone dose.  Discussed the need for stress dose steroids when acutely ill (eg having fever, pneumonia) or if undergoing surgery.  Would need to increase hydrocortisone by 2-3x normal dose, and before surgery, would recommend iv hydrocortisone on the morning of surgery with rapid taper to usual dose if no complications.  \par RTO 6 months but will check labs in between.

## 2021-11-05 NOTE — HISTORY OF PRESENT ILLNESS
[Home] : at home, [unfilled] , at the time of the visit. [Medical Office: ([facility])___] : at the medical office located in  [Verbal consent obtained from patient] : the patient, [unfilled] [FreeTextEntry1] : Daughter also present on video\par Feeling cold all the time\par no constipation.   not more sleepy, still has trouble falling asleep at night but sometimes dozes off while reading during the day\par no dizziness or lightheadedness\par no hair loss\par weight is 145 lb\par heart rate now ranging 45-60\par has new PCP, was told cholesterol is high\par labs reviewed from 11/21:  low T3, T4\par she has follow up with Dr To at end of 2022.\par \par PMH: pit macroadenoma, surgery 2008\par thyroid nodule, hyperthyroid\par \par Meds:\par Hydrocortisone 10mg am and  5mg pm\par methimazole 5mg/day\par Levothyroxine 75mcg -- off\par Lisinopril, HCTZ, \par Simvastatin 10mg\par

## 2021-11-05 NOTE — DATA REVIEWED
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  smaller L lobe with less activity c/w cold nodule in L lobe\par \par MRI, 2018, pituitary: \par expected post surgical change.  no masslike enhancing tissue in sella or suprasellar cistern.  thin rim on enhancing tissue lining sells, presumably residual pituitary gland.  stalk deviated to R.

## 2021-12-13 ENCOUNTER — TRANSCRIPTION ENCOUNTER (OUTPATIENT)
Age: 81
End: 2021-12-13

## 2022-04-08 ENCOUNTER — TRANSCRIPTION ENCOUNTER (OUTPATIENT)
Age: 82
End: 2022-04-08

## 2022-04-08 RX ORDER — METHIMAZOLE 5 MG/1
5 TABLET ORAL DAILY
Qty: 90 | Refills: 0 | Status: ACTIVE | COMMUNITY
Start: 2022-04-08 | End: 1900-01-01

## 2022-04-11 ENCOUNTER — TRANSCRIPTION ENCOUNTER (OUTPATIENT)
Age: 82
End: 2022-04-11

## 2022-04-22 ENCOUNTER — TRANSCRIPTION ENCOUNTER (OUTPATIENT)
Age: 82
End: 2022-04-22

## 2022-05-10 ENCOUNTER — RX RENEWAL (OUTPATIENT)
Age: 82
End: 2022-05-10

## 2022-05-16 ENCOUNTER — TRANSCRIPTION ENCOUNTER (OUTPATIENT)
Age: 82
End: 2022-05-16

## 2023-03-15 ENCOUNTER — NON-APPOINTMENT (OUTPATIENT)
Age: 83
End: 2023-03-15

## 2023-03-16 ENCOUNTER — APPOINTMENT (OUTPATIENT)
Dept: NEUROSURGERY | Facility: CLINIC | Age: 83
End: 2023-03-16
Payer: MEDICARE

## 2023-03-16 PROCEDURE — 99442: CPT | Mod: 95

## 2023-03-16 NOTE — ASSESSMENT
[FreeTextEntry1] : The patient’s established problem of resected pituitary adenoma.    I had a long discussion with the patient regarding the role of serial imaging in the coming weeks.  Therapeutic and diagnostic tests include MRI pituitary with and without contrast. She will follow up with Dr. To after MRI is complete.. I have explained the alternatives, risks and benefits to the patient ands he understands and agrees to proceed. \par \par

## 2023-03-16 NOTE — REASON FOR VISIT
[Home] : at home, [unfilled] , at the time of the visit. [Medical Office: ([facility])___] : at the medical office located in  [Patient] : the patient [New Patient Visit] : a new patient visit [FreeTextEntry1] : 82  year old female s/p endoscopic resection of pituitary tumor on 12/15/2008 at [facility]\par Pathology: consistent with infarcted pituitary adenoma\par \par Also s/p removal of bone plate in back of the nose on 3/1/2010.\par \par She has been feeling well. She feels well. Denies headaches, nausea, vomiting, vision or gait changes, excessive thirst or urination, or excessive fatigue.\par \par She presents today to reestablish care after 5 years.\par \par She denies abnormal growth of hands/feet, breast leaking, abnormal growth of facial features, significant unexplained weight loss/gain, sudden or peripheral vision loss and abnormal hair growth/loss.\par

## 2023-04-13 ENCOUNTER — APPOINTMENT (OUTPATIENT)
Dept: NEUROSURGERY | Facility: HOSPITAL | Age: 83
End: 2023-04-13

## 2023-04-14 ENCOUNTER — OUTPATIENT (OUTPATIENT)
Dept: OUTPATIENT SERVICES | Facility: HOSPITAL | Age: 83
LOS: 1 days | End: 2023-04-14
Payer: MEDICARE

## 2023-04-14 ENCOUNTER — APPOINTMENT (OUTPATIENT)
Dept: MRI IMAGING | Facility: HOSPITAL | Age: 83
End: 2023-04-14

## 2023-04-14 DIAGNOSIS — K46.9 UNSPECIFIED ABDOMINAL HERNIA WITHOUT OBSTRUCTION OR GANGRENE: Chronic | ICD-10-CM

## 2023-04-14 DIAGNOSIS — Z90.49 ACQUIRED ABSENCE OF OTHER SPECIFIED PARTS OF DIGESTIVE TRACT: Chronic | ICD-10-CM

## 2023-04-14 PROCEDURE — 70552 MRI BRAIN STEM W/DYE: CPT

## 2023-04-14 PROCEDURE — A9585: CPT

## 2023-04-14 PROCEDURE — 70552 MRI BRAIN STEM W/DYE: CPT | Mod: 26,MH

## 2023-05-01 ENCOUNTER — APPOINTMENT (OUTPATIENT)
Dept: NEUROSURGERY | Facility: CLINIC | Age: 83
End: 2023-05-01
Payer: MEDICARE

## 2023-05-01 PROCEDURE — 99214 OFFICE O/P EST MOD 30 MIN: CPT | Mod: 95

## 2023-05-01 NOTE — ED ADULT NURSE NOTE - NSIMPLEMENTINTERV_GEN_ALL_ED
Please schedule next available for hospital f/u. Needs to be F2F.   Implemented All Universal Safety Interventions:  [place] to call system. Call bell, personal items and telephone within reach. Instruct patient to call for assistance. Room bathroom lighting operational. Non-slip footwear when patient is off stretcher. Physically safe environment: no spills, clutter or unnecessary equipment. Stretcher in lowest position, wheels locked, appropriate side rails in place.

## 2023-05-01 NOTE — REASON FOR VISIT
[New Patient Visit] : a new patient visit [Home] : at home, [unfilled] , at the time of the visit. [Medical Office: ([facility])___] : at the medical office located in  [FreeTextEntry1] : 83  year old female s/p endoscopic resection of pituitary tumor on 12/15/2008 at [facility]\par Pathology: consistent with infarcted pituitary adenoma\par \par Also s/p removal of bone plate in back of the nose on 3/1/2010.\par \par She has been feeling well. She feels well. Denies headaches, nausea, vomiting, vision or gait changes, excessive thirst or urination, or excessive fatigue.\par \par She presents today via telehealth with a new MRI for review, she continues on hydrocortisone and methamezole. She denies any vision changes. \par \par She denies abnormal growth of hands/feet, breast leaking, abnormal growth of facial features, significant unexplained weight loss/gain, sudden or peripheral vision loss and abnormal hair growth/loss.\par

## 2023-05-01 NOTE — ASSESSMENT
[FreeTextEntry1] : The patient’s established problem of resected pituitary adenoma with no evidence of residual or recurrence.    I had a long discussion with the patient regarding the role of stopping serial imaging.  I will see the patient back in  as needed. I have explained the alternatives, risks and benefits to the patient ands she understands and agrees to proceed. \par \par

## 2023-05-01 NOTE — DATA REVIEWED
[de-identified] : I have reviewed the most recent MRI 4/21/23 which shows no evidence of residual or recurrent adenoma

## 2024-04-01 ENCOUNTER — DOCTOR'S OFFICE (OUTPATIENT)
Dept: URBAN - METROPOLITAN AREA CLINIC 166 | Facility: CLINIC | Age: 84
Setting detail: OPHTHALMOLOGY
End: 2024-04-01
Payer: MEDICARE

## 2024-04-01 DIAGNOSIS — H25.013: ICD-10-CM

## 2024-04-01 DIAGNOSIS — H25.13: ICD-10-CM

## 2024-04-01 DIAGNOSIS — H52.4: ICD-10-CM

## 2024-04-01 DIAGNOSIS — H40.052: ICD-10-CM

## 2024-04-01 PROCEDURE — 92133 CPTRZD OPH DX IMG PST SGM ON: CPT | Performed by: OPHTHALMOLOGY

## 2024-04-01 PROCEDURE — 92015 DETERMINE REFRACTIVE STATE: CPT | Performed by: OPHTHALMOLOGY

## 2024-04-01 PROCEDURE — 92004 COMPRE OPH EXAM NEW PT 1/>: CPT | Performed by: OPHTHALMOLOGY

## 2025-01-08 NOTE — ED PROVIDER NOTE - OBJECTIVE STATEMENT
No change The pt is a 81 y/o F, who presents to ED w/daughter, stating "Dr Huffman said to come in" - was dx'd w/4 cm thyroid nodule (has had MRIs, us) has seen ent (in [place]) who recommended a bx, then saw and endocrinologist (also in [place]) who recommended thyroidectomy, states that feels like she's getting contradicting information so spoke to dr huffman and was told to go to [facility] ed. Pt c/o sob, winded at times, tired, weight loss of 10 lbs, bp fluctuations. Denies fevers, chills, cp, n/v/d, abd pain, dysphagia, dyspnea

## 2025-04-21 NOTE — ED ADULT NURSE NOTE - NSFALLRSKASSESSDT_ED_ALL_ED
Provider Staff:  Action required for this patient     Please see care gap opportunities below in Care Due Message.    Thanks!  [facility]     Appointments      Date Provider   Last Visit   2/20/2025 Vishal Sanchez MD   Next Visit   5/20/2025 Vishal Sanchez MD      Refill Decision Note   Brunilda Delgadillo  is requesting a refill authorization.  Brief Assessment and Rationale for Refill:  Approve     Medication Therapy Plan:         Pharmacist review requested: Yes   Extended chart review required: Yes   Comments:     Note composed:11:44 AM 04/21/2025            11-Mar-2021 12:47

## 2025-06-19 ENCOUNTER — DOCTOR'S OFFICE (OUTPATIENT)
Dept: URBAN - METROPOLITAN AREA CLINIC 166 | Facility: CLINIC | Age: 85
Setting detail: OPHTHALMOLOGY
End: 2025-06-19
Payer: MEDICARE

## 2025-06-19 DIAGNOSIS — H25.013: ICD-10-CM

## 2025-06-19 DIAGNOSIS — H40.052: ICD-10-CM

## 2025-06-19 DIAGNOSIS — H25.13: ICD-10-CM

## 2025-06-19 PROCEDURE — 92014 COMPRE OPH EXAM EST PT 1/>: CPT | Performed by: OPHTHALMOLOGY

## 2025-06-19 PROCEDURE — 92015 DETERMINE REFRACTIVE STATE: CPT | Performed by: OPHTHALMOLOGY

## 2025-06-19 ASSESSMENT — KERATOMETRY
OS_AXISANGLE_DEGREES: 001
OD_AXISANGLE_DEGREES: 031
OD_K2POWER_DIOPTERS: 43.25
OD_K1POWER_DIOPTERS: 42.50
OS_K2POWER_DIOPTERS: 43.25
OS_K1POWER_DIOPTERS: 42.75

## 2025-06-19 ASSESSMENT — REFRACTION_MANIFEST
OS_VA2: 20/20(J1+)
OS_VA1: 20/20-2
OD_VA1: 20/20-2
OD_ADD: +2.75
OD_SPHERE: -0.75
OD_VA1: 20/20
OS_VA2: 20/BINOC
OS_SPHERE: -1.25
OS_VA1: 20/25-1
OS_CYLINDER: +1.00
OS_ADD: +2.75
OD_AXIS: 015
OD_CYLINDER: +0.75
OS_CYLINDER: +1.00
OD_CYLINDER: +0.50
OS_SPHERE: -1.00
OU_VA: 20/25
OS_ADD: +2.75
OD_VA2: 20/20(J1+)
OD_AXIS: 015
OD_ADD: +2.75
OD_VA2: 20/20
OS_AXIS: 005
OD_SPHERE: -0.75
OS_AXIS: 005
OU_VA: 20/20-1

## 2025-06-19 ASSESSMENT — REFRACTION_CURRENTRX
OD_VPRISM_DIRECTION: BF
OD_SPHERE: -0.75
OS_AXIS: 006
OD_CYLINDER: +1.00
OD_OVR_VA: 20/
OS_OVR_VA: 20/
OD_ADD: +2.75
OS_CYLINDER: +1.00
OS_ADD: +2.75
OS_VPRISM_DIRECTION: BF
OS_SPHERE: -1.50
OD_AXIS: 017

## 2025-06-19 ASSESSMENT — VISUAL ACUITY
OS_BCVA: 20/40+2
OD_BCVA: 20/25

## 2025-06-19 ASSESSMENT — REFRACTION_AUTOREFRACTION
OS_SPHERE: -0.75
OD_SPHERE: -0.50
OD_AXIS: 018
OS_CYLINDER: +1.75
OS_AXIS: 005
OD_CYLINDER: +0.75

## 2025-06-19 ASSESSMENT — TONOMETRY
OS_IOP_MMHG: 20
OD_IOP_MMHG: 18

## 2025-06-19 ASSESSMENT — CONFRONTATIONAL VISUAL FIELD TEST (CVF)
OS_FINDINGS: FULL
OD_FINDINGS: FULL